# Patient Record
Sex: MALE | Race: ASIAN
[De-identification: names, ages, dates, MRNs, and addresses within clinical notes are randomized per-mention and may not be internally consistent; named-entity substitution may affect disease eponyms.]

---

## 2019-01-17 ENCOUNTER — HOSPITAL ENCOUNTER (INPATIENT)
Dept: HOSPITAL 96 - M.ERS | Age: 44
LOS: 1 days | Discharge: HOME | DRG: 303 | End: 2019-01-18
Attending: INTERNAL MEDICINE | Admitting: INTERNAL MEDICINE
Payer: COMMERCIAL

## 2019-01-17 VITALS — DIASTOLIC BLOOD PRESSURE: 104 MMHG | SYSTOLIC BLOOD PRESSURE: 152 MMHG

## 2019-01-17 VITALS — DIASTOLIC BLOOD PRESSURE: 86 MMHG | SYSTOLIC BLOOD PRESSURE: 123 MMHG

## 2019-01-17 VITALS — DIASTOLIC BLOOD PRESSURE: 81 MMHG | SYSTOLIC BLOOD PRESSURE: 129 MMHG

## 2019-01-17 VITALS — DIASTOLIC BLOOD PRESSURE: 81 MMHG | SYSTOLIC BLOOD PRESSURE: 127 MMHG

## 2019-01-17 VITALS — WEIGHT: 158 LBS | BODY MASS INDEX: 22.62 KG/M2 | HEIGHT: 70 IN

## 2019-01-17 DIAGNOSIS — Z88.8: ICD-10-CM

## 2019-01-17 DIAGNOSIS — I10: ICD-10-CM

## 2019-01-17 DIAGNOSIS — Z88.6: ICD-10-CM

## 2019-01-17 DIAGNOSIS — Z95.5: ICD-10-CM

## 2019-01-17 DIAGNOSIS — I25.2: ICD-10-CM

## 2019-01-17 DIAGNOSIS — I25.119: Primary | ICD-10-CM

## 2019-01-17 DIAGNOSIS — Z82.49: ICD-10-CM

## 2019-01-17 DIAGNOSIS — F12.90: ICD-10-CM

## 2019-01-17 DIAGNOSIS — Z91.19: ICD-10-CM

## 2019-01-17 DIAGNOSIS — Z87.891: ICD-10-CM

## 2019-01-17 DIAGNOSIS — E78.5: ICD-10-CM

## 2019-01-17 DIAGNOSIS — Z90.81: ICD-10-CM

## 2019-01-17 DIAGNOSIS — Z90.49: ICD-10-CM

## 2019-01-17 LAB
ABSOLUTE BASOPHILS: 0.1 THOU/UL (ref 0–0.2)
ABSOLUTE EOSINOPHILS: 0.1 THOU/UL (ref 0–0.7)
ABSOLUTE MONOCYTES: 0.7 THOU/UL (ref 0–1.2)
ALBUMIN SERPL-MCNC: 4 G/DL (ref 3.4–5)
ALP SERPL-CCNC: 71 U/L (ref 46–116)
ALT SERPL-CCNC: 25 U/L (ref 30–65)
ANION GAP SERPL CALC-SCNC: 6 MMOL/L (ref 7–16)
AST SERPL-CCNC: 13 U/L (ref 15–37)
BASOPHILS NFR BLD AUTO: 0.9 %
BILIRUB SERPL-MCNC: 0.4 MG/DL
BUN SERPL-MCNC: 10 MG/DL (ref 7–18)
CALCIUM SERPL-MCNC: 9 MG/DL (ref 8.5–10.1)
CHLORIDE SERPL-SCNC: 102 MMOL/L (ref 98–107)
CHOLEST SERPL-MCNC: 212 MG/DL (ref ?–200)
CO2 SERPL-SCNC: 30 MMOL/L (ref 21–32)
CREAT SERPL-MCNC: 1 MG/DL (ref 0.6–1.3)
EOSINOPHIL NFR BLD: 1.1 %
GLUCOSE SERPL-MCNC: 129 MG/DL (ref 70–99)
GRANULOCYTES NFR BLD MANUAL: 55.3 %
HCT VFR BLD CALC: 48.6 % (ref 42–52)
HDLC SERPL-MCNC: 43 MG/DL (ref 40–?)
HGB BLD-MCNC: 16.4 GM/DL (ref 14–18)
LDLC SERPL-MCNC: 149 MG/DL (ref ?–100)
LYMPHOCYTES # BLD: 3.6 THOU/UL (ref 0.8–5.3)
LYMPHOCYTES NFR BLD AUTO: 36 %
MAGNESIUM SERPL-MCNC: 1.8 MG/DL (ref 1.8–2.4)
MCH RBC QN AUTO: 30.5 PG (ref 26–34)
MCHC RBC AUTO-ENTMCNC: 33.7 G/DL (ref 28–37)
MCV RBC: 90.4 FL (ref 80–100)
MONOCYTES NFR BLD: 6.7 %
MPV: 11.3 FL. (ref 7.2–11.1)
NEUTROPHILS # BLD: 5.5 THOU/UL (ref 1.6–8.1)
NUCLEATED RBCS: 0 /100WBC
PLATELET # BLD EST: ADEQUATE 10*3/UL
PLATELET COUNT*: 209 THOU/UL (ref 150–400)
POTASSIUM SERPL-SCNC: 3.9 MMOL/L (ref 3.5–5.1)
PROT SERPL-MCNC: 7.7 G/DL (ref 6.4–8.2)
RBC # BLD AUTO: 5.37 MIL/UL (ref 4.5–6)
RBC MORPH BLD: NORMAL
RDW-CV: 14.2 % (ref 10.5–14.5)
SODIUM SERPL-SCNC: 138 MMOL/L (ref 136–145)
TC:HDL: 4.9 RATIO
TRIGL SERPL-MCNC: 100 MG/DL (ref ?–150)
TROPONIN-I LEVEL: <0.06 NG/ML (ref ?–0.06)
VLDLC SERPL CALC-MCNC: 20 MG/DL (ref ?–40)
WBC # BLD AUTO: 10 THOU/UL (ref 4–11)

## 2019-01-17 NOTE — NUR
PT ARRIVED ON UNIT AT 1125, C/O DULL ACHE TO LEFT SIDE OF CHEST, RATING AT A
2/10. O2 2LPM VIA NC, LS CTA, VSS, TRACING SINUS MELYSSA ON CARDIAC MONITOR. PT
IS UP AD JULIA, A&O X4, ASSESSMENT COMPLETE, ORIENTED PT TO ROOM AND CALL LIGHT.

## 2019-01-17 NOTE — EKG
[place] [address]
Phone:  [phone]                     ELECTROCARDIOGRAM REPORT      
_______________________________________________________________________________
 
Name:       MAXINE STEPHENS        Room:           [place] [address]   ADM IN  
[facility].#:  [id number]      Account #:      [id number]  
Admission:  19     Attend Phys:    Abdiel Tee MD 
Discharge:               Date of Birth:  [birth year]  
         Report #: [id number]
    [id number]
_______________________________________________________________________________
THIS REPORT FOR:  //name//                      
 
                         [facility] ED
                                       
Test Date:    2019               Test Time:    07:45:15
Pat Name:     MAXINE STEPHENS           Department:   
Patient ID:   [id number]            Room:         [facility]
Gender:       M                        Technician:   KARLA
:          1975               Requested By: Joaquin De Leon
Order Number: [id number] MD:   Yannick Acevedo
                                 Measurements
Intervals                              Axis          
Rate:         71                       P:            45
VA:           155                      QRS:          59
QRSD:         93                       T:            22
QT:           341                                    
QTc:          371                                    
                           Interpretive Statements
Sinus rhythm
Consider left ventricular hypertrophy
ST elev, probable normal early repol pattern
Baseline wander in lead(s) V3,V5
Compared to ECG 2016 14:23:17
Sinus bradycardia no longer present
ST (T wave) deviation still present
 
Electronically Signed On 2019 10:24:46 CST by Yannick Acevedo
[email or web address]
 
 
 
 
 
 
 
 
 
 
 
 
 
 
 
  <ELECTRONICALLY SIGNED>
                                           By: Yannick Acevedo MD, FACC   
  19     1024
D: 19 0745   _____________________________________
T: 19 0745   Yannick Acevedo MD, FACC     /EPI

## 2019-01-18 VITALS — DIASTOLIC BLOOD PRESSURE: 100 MMHG | SYSTOLIC BLOOD PRESSURE: 134 MMHG

## 2019-01-18 VITALS — SYSTOLIC BLOOD PRESSURE: 133 MMHG | DIASTOLIC BLOOD PRESSURE: 91 MMHG

## 2019-01-18 VITALS — DIASTOLIC BLOOD PRESSURE: 76 MMHG | SYSTOLIC BLOOD PRESSURE: 137 MMHG

## 2019-01-18 VITALS — SYSTOLIC BLOOD PRESSURE: 123 MMHG | DIASTOLIC BLOOD PRESSURE: 84 MMHG

## 2019-01-18 VITALS — DIASTOLIC BLOOD PRESSURE: 90 MMHG | SYSTOLIC BLOOD PRESSURE: 141 MMHG

## 2019-01-18 LAB
ABSOLUTE BASOPHILS: 0.1 THOU/UL (ref 0–0.2)
ABSOLUTE EOSINOPHILS: 0.2 THOU/UL (ref 0–0.7)
ABSOLUTE MONOCYTES: 0.7 THOU/UL (ref 0–1.2)
ANION GAP SERPL CALC-SCNC: 6 MMOL/L (ref 7–16)
BASOPHILS NFR BLD AUTO: 1.1 %
BUN SERPL-MCNC: 12 MG/DL (ref 7–18)
CALCIUM SERPL-MCNC: 9.2 MG/DL (ref 8.5–10.1)
CHLORIDE SERPL-SCNC: 105 MMOL/L (ref 98–107)
CO2 SERPL-SCNC: 27 MMOL/L (ref 21–32)
CREAT SERPL-MCNC: 0.9 MG/DL (ref 0.6–1.3)
EOSINOPHIL NFR BLD: 2.5 %
GLUCOSE SERPL-MCNC: 112 MG/DL (ref 70–99)
GRANULOCYTES NFR BLD MANUAL: 45 %
HCT VFR BLD CALC: 47.7 % (ref 42–52)
HGB BLD-MCNC: 16.3 GM/DL (ref 14–18)
LYMPHOCYTES # BLD: 3.8 THOU/UL (ref 0.8–5.3)
LYMPHOCYTES NFR BLD AUTO: 43.3 %
MCH RBC QN AUTO: 30.6 PG (ref 26–34)
MCHC RBC AUTO-ENTMCNC: 34.2 G/DL (ref 28–37)
MCV RBC: 89.3 FL (ref 80–100)
MONOCYTES NFR BLD: 8.1 %
MPV: 10.8 FL. (ref 7.2–11.1)
NEUTROPHILS # BLD: 3.9 THOU/UL (ref 1.6–8.1)
NUCLEATED RBCS: 0 /100WBC
PLATELET COUNT*: 216 THOU/UL (ref 150–400)
POTASSIUM SERPL-SCNC: 4 MMOL/L (ref 3.5–5.1)
RBC # BLD AUTO: 5.33 MIL/UL (ref 4.5–6)
RDW-CV: 14.3 % (ref 10.5–14.5)
SODIUM SERPL-SCNC: 138 MMOL/L (ref 136–145)
WBC # BLD AUTO: 8.7 THOU/UL (ref 4–11)

## 2019-01-18 NOTE — CON
[address]  [address]                    CONSULTATION                  
_______________________________________________________________________________
 
Name:       MAXINE STEPHENS        Room:           [address] [place]  
M.R.#:  [id number]      Account #:      [id number]  
Admission:  01/17/19     Attend Phys:    Abdiel Tee MD 
Discharge:               Date of Birth:  [birth year]  
         Report #: [id number]
                                                                     [id number]  
_______________________________________________________________________________
THIS REPORT FOR:  //name//                      
 
CC: Abdiel Ruth
    [facility] physician/PCP
 
DATE OF SERVICE:  01/17/2019
 
 
PRIMARY CARE PHYSICIAN:  None.
 
CHIEF COMPLAINT:  Chest pain, weakness, dizziness.
 
HISTORY OF PRESENT ILLNESS:  The patient is a 43-year-old man with a history of
known coronary artery disease and prior PCI in 2016, was sitting at a meeting at
work and he felt some dizziness, nauseous and felt he could pass out, but he did
not pass out.  He did have some associated chest pressure with that.  He was
worried because there were some similar symptoms to his heart attack.  He
presented to the Emergency Room, his ECG demonstrated a sinus rhythm, normal ST
segments and upon my evaluation on telemetry, the patient is currently
asymptomatic.
 
He denies orthopnea or PND.
 
He is very active and he denies chest pain or pressure.
 
He has not been taking any medications.  His only medications include
supplements.  He had stopped his aspirin, cholesterol, etc. drugs after his PCI.
 He does not have a primary care doctor.  He does not follow up with Dr. Church
to perform his coronary intervention.
 
PAST MEDICAL HISTORY:  Cardiac catheterization in 11/28/2016 revealed a normal
ejection fraction.  He had a 50% LAD stenosis mid body and then an 80% stenosis
in the proximal circumflex coronary artery and the right coronary artery had a
proximal 90% stenosis and a previously placed stent in the distal right coronary
artery, which was patent.  He had bare metal stents placed in the proximal right
coronary artery and circumflex and he was discharged and then he really did not
follow up with us.  Previous splenectomy, appendectomy and hyperlipidemia.  He
is not diabetic.  He is not a smoker.  He was smoking at the time of his
previous PCI, but he has quit smoking.
 
FAMILY HISTORY:  Strong, his father has had MIs and a stent in his 50s.
 
ALLERGIES:  HE HAS ALLERGIES TO PLAVIX WITH RASH.
 
SOCIAL HISTORY:  He is .  He has a history of marijuana use.
 
 
 
[place] [address]                    CONSULTATION                  
_______________________________________________________________________________
 
Name:       STEPHENSMAXINE MATT ANTHONY        Room:           [address]#:  [id number]      Account #:      [id number]  
Admission:  01/17/19     Attend Phys:    Abdiel Tee MD 
Discharge:               Date of Birth:  [birth year]  
         Report #: [id number]
                                                                     [id number]  
_______________________________________________________________________________
 
HOME MEDICATIONS:  None.
 
REVIEW OF SYSTEMS:
GENERAL:  No fevers or chills.
PULMONARY:  No wheezing or cough.
HEMATOLOGIC:  No anemia or bleeding disorders.
NEUROLOGIC:  Denies seizures, headaches or blurry vision.
THROAT:  Denies any dysphagia.
MUSCULOSKELETAL:  No falls.
SKIN:  No rashes.
GASTROINTESTINAL:  No anorexia, nausea or vomiting.
 
OBJECTIVE:
VITAL SIGNS:  Blood pressure is 127/81, pulse is 95, O2 sat is 99% on 2 liters.
GENERAL:  Thin, adult male.  He is alert, in no apparent distress.
HEENT:  Eyes are intact.  No facial asymmetry.
NECK:  Supple.  No jugular venous distention.
CARDIOVASCULAR:  Regular.  I cannot hear a murmur, S3 rub or gallop.
LUNGS:  Clear to auscultation.
ABDOMEN:  Soft, nontender.
EXTREMITIES:  No peripheral edema.
SKIN:  Warm and dry.
 
LABORATORY DATA:  Electrocardiogram shows a sinus rhythm with borderline LVH,
but normal ST segments.  His troponin I first set is 0.06.  His hemoglobin is
16.4, platelet count is 209,000.  Lipids are pending.  His sodium is 138,
potassium 3.9, creatinine is 1.0.  AST is 13, ALT is 25.
 
IMPRESSION:
1.  Chest pain.  His symptoms are somewhat atypical.  They could be vasovagal,
but he has been off of medications including aspirin and has a high risk for
recurrence of coronary artery disease, so we will evaluate him with serial
cardiac troponin levels and keep him on telemetry.  We will plan for stress
testing, if he rules out,
2.  Coronary artery disease.  It is recommended that the patient in lay terms,
he should continue aspirin above and beyond everything else.
3.  Hyperlipidemia.  We will resurvey lipids and treat accordingly.
4.  Vasovagal syncope.  He did not have syncope, but his symptoms seem more
compatible with this and I recommend lifestyle modifications.
 
 
 
 
<ELECTRONICALLY SIGNED>
                                        By:  Yannick Acevedo MD, FACC   
01/18/19     1340
D: 01/17/19 1220_______________________________________
T: 01/17/19 1237Yannick Acevedo MD, FACC      /nt

## 2019-01-18 NOTE — NUR
PT VSS AND STRESS TEST NEGATIVE. PT D/C'D TO HOME WITH NO COMPLICATIONS. PT
EDUCATED TO CHECK BP EVERYDAY AT THE SAME TIME AFTER RELAXING FOR 15
MINUTES AND RECORD BP DAILY AND TAKE TO FOLLOW UP PCP APPOINTMENT IN 2 WEEKS.
PT VERBALIZED UNDERSTANDING TO DC INSTRUCTIONS AT THIS TIME. IV REMOVED INTACT
AND PT AMBULATED OUT WITH STEADY GAIT WITH RN STAFF. HOURLY ROUNDING
MAINTAINED THIS SHIFT.
WILL SIGN OFF CARE AT THIS TIME.

## 2019-01-18 NOTE — CARDNUC
[place] [address]
Phone:  [phone]                     CARDIAC NUCLEAR IMAGING REPORT
_______________________________________________________________________________
 
Name:         MAXINE STEPHENS       Room:          [address] [place] 
[facility]#:    [id number]     Account #:     [id number]  
Admission:    19    Attend Phys:   Abdiel Tee, 
Discharge:                Date of Birth: [birth year]  
Date of Service: 19 1645  Report #:      [id number]
        [id number] 
_______________________________________________________________________________
THIS REPORT FOR:  //name//                      
 
 
--------------- APPROVED REPORT --------------
 
 
Imaging Protocol: Rest Tc-99m/Stress Tc-99m 1 day
Study performed:  2019 12:32:00
 
Indication: Chest pain, dizzy and nausea
Patient Location: In-Patient
Room #: [facility]
Stress Tech: Carolina Chandler
Stress Nurse: Leydi Veloz RN
NM Tech:EVARISTO Gonzalez
 
Ht: 5 ft 10 in  Wt: 159 lbs  BSA:  1.89 m2
    BMI:  22.81
 
Medical History
Medical History: cad, hyperlipidemia,
Medications: enoxaprin, asa 325
Allergies: hydrocodone, statins, plavix
Cardiac Risk Factors: mi, cad, hyperlipidemia, family hx
Previous Cardiac Procedures: PCI
Exercise History: Physically active
 
Resting Data
Rest SPECT myocardial perfusion imaging was performed in supine 
position 30 minutes following the intravenous injection of 11.7 mCi 
of Tc-99m Sestamibi.
Time of rest injection: 0850     Date: 2019
Time of rest imagin     
The images were gated to evaluate regional wall motion and calculate 
left ventricular ejection fraction. 
Administration Route: IV
Administration Site: Left AC
 
Exercise Stress
At peak stress, the patient was injected intravenously with 31.6mCi 
of Tc-99m Sestamibi.
Time of stress injection: 1045     
Time of stress imagin     
Administration Route: IV
Administration Site: Left AC
Gated Stress SPECT was performed 30 minutes after stress 
 
 
[place] [address]
Phone:  [phone]                     CARDIAC NUCLEAR IMAGING REPORT
_______________________________________________________________________________
 
Name:         MAXINE STEPHENS       Room:          [address]#:    [id number]     Account #:     [id number]  
Admission:    19    Attend Phys:   Abdiel Tee, 
Discharge:                Date of Birth: [birth year]  
Date of Service: 19 1645  Report #:      [id number]
        [id number] 
_______________________________________________________________________________
injection.
The images were gated to evaluate regional wall motion and calculate 
left ventricular ejection fraction. 
Prone imaging was performed.
 
Stress Test Details
Stress Test:  Exercise stress testing was performed using a Luke 
protocol.      
 
HR      Max Heart Rate (APMHR): 178 bpm  
Resting HR:            53 bpm   Target HR (85% APMHR): 151 bpm  
Max HR Achieved:  162 bpm        
% of APMHR:         91         
Recovery HR:            77 bpm        
HR response to stress: Normal HR response to stress      
 
BP           
Resting BP:  143/82 mmHg        
Max BP:       218/83 mmHg        
Recovery BP:       145/99 mmHg        
BP response to stress: Normal blood pressure response to 
stress.      
ECG           
Resting ECG:  Sinus Rhythm        
Stress ECG:     Sinus Rhythm        
ST Change: None          
Recovery ECG: Sinus Rhythm        
Recovery ST Change: None        
 
Clinical
Reason for Termination: Fatigue
Stress Symptoms: None
Exercise duration: 9 min 7 sec
Exercise capacity: 10.31 METs
Functional Aerobic Impairment  90%
 
Stress ECG Conclusion
negative ecg
 
Study Quality
Study: Good
Artifact: Mild Increased GI uptake
Lung Uptake: Normal
 
Study Data
At rest, the left ventricular ejection fraction was 65%..   
 
 
[place] [address]
Phone:  [phone]                     CARDIAC NUCLEAR IMAGING REPORT
_______________________________________________________________________________
 
Name:         MAXINE STEPHENS       Room:          [address] [place] 
[facility]#:    [id number]     Account #:     [id number]  
Admission:    19    Attend Phys:   Abdiel Tee, 
Discharge:                Date of Birth: [birth year]  
Date of Service: 19 1645  Report #:      [id number]
        [id number] 
_______________________________________________________________________________
Post stress, the left ventricular ejection was 77%..   
SSS: 0
SRS: 2
SDS: -2      
 
Perfusion
STRESS SPECT images show a small mild intensity inferior defect which 
is noted to be fixed when compared to the SPECT rest images. There is 
uniform uptake of tracer in all other segments. The prone set shows 
normalization of the inferior defect indicating it is likely 
artifact. No reversible defects are seen.
 
Wall Motion
normal all segments
 
Nuclear Conclusion
ECG Findings: negative for ischemia 
Clinical Findings: negative for ischemia 
Nuclear Findings: negative for ischemia 
Exercise Capacity: normal
Left Ventricular Function: normal 
Risk Study: low
Negative exercise perfusion nuclear stress test for ischemia or 
infarct. 
 
<Conclusion>
negative ecg
 
 
 
 
 
 
 
 
 
 
 
 
 
 
 
 
 
  <ELECTRONICALLY SIGNED>
                                           By: Yannick Acevedo MD, FACC   
  19
D: 19   _____________________________________
T: 19   Yannick Acevedo MD, FACC     /INF

## 2020-11-21 ENCOUNTER — HOSPITAL ENCOUNTER (OUTPATIENT)
Dept: HOSPITAL 96 - M.ERS | Age: 45
Setting detail: OBSERVATION
LOS: 1 days | Discharge: HOME | End: 2020-11-22
Attending: INTERNAL MEDICINE | Admitting: INTERNAL MEDICINE
Payer: COMMERCIAL

## 2020-11-21 VITALS — DIASTOLIC BLOOD PRESSURE: 87 MMHG | SYSTOLIC BLOOD PRESSURE: 132 MMHG

## 2020-11-21 VITALS — HEIGHT: 70 IN | WEIGHT: 172 LBS | BODY MASS INDEX: 24.62 KG/M2

## 2020-11-21 VITALS — DIASTOLIC BLOOD PRESSURE: 92 MMHG | SYSTOLIC BLOOD PRESSURE: 153 MMHG

## 2020-11-21 DIAGNOSIS — E78.5: ICD-10-CM

## 2020-11-21 DIAGNOSIS — I25.10: ICD-10-CM

## 2020-11-21 DIAGNOSIS — Z79.899: ICD-10-CM

## 2020-11-21 DIAGNOSIS — R07.89: Primary | ICD-10-CM

## 2020-11-21 DIAGNOSIS — I10: ICD-10-CM

## 2020-11-21 DIAGNOSIS — K21.9: ICD-10-CM

## 2020-11-21 DIAGNOSIS — F17.200: ICD-10-CM

## 2020-11-21 DIAGNOSIS — Z79.82: ICD-10-CM

## 2020-11-21 LAB
ABSOLUTE BASOPHILS: 0 THOU/UL (ref 0–0.2)
ABSOLUTE EOSINOPHILS: 0.2 THOU/UL (ref 0–0.7)
ABSOLUTE MONOCYTES: 0.7 THOU/UL (ref 0–1.2)
ALBUMIN SERPL-MCNC: 3.3 G/DL (ref 3.4–5)
ALP SERPL-CCNC: 63 U/L (ref 46–116)
ALT SERPL-CCNC: 23 U/L (ref 30–65)
ANION GAP SERPL CALC-SCNC: 5 MMOL/L (ref 7–16)
AST SERPL-CCNC: 11 U/L (ref 15–37)
BASOPHILS NFR BLD AUTO: 0.3 %
BILIRUB SERPL-MCNC: 0.1 MG/DL
BUN SERPL-MCNC: 10 MG/DL (ref 7–18)
CALCIUM SERPL-MCNC: 8.1 MG/DL (ref 8.5–10.1)
CHLORIDE SERPL-SCNC: 106 MMOL/L (ref 98–107)
CO2 SERPL-SCNC: 28 MMOL/L (ref 21–32)
CREAT SERPL-MCNC: 0.8 MG/DL (ref 0.6–1.3)
EOSINOPHIL NFR BLD: 2.3 %
GLUCOSE SERPL-MCNC: 115 MG/DL (ref 70–99)
GRANULOCYTES NFR BLD MANUAL: 51.6 %
HCT VFR BLD CALC: 43.1 % (ref 42–52)
HGB BLD-MCNC: 14.5 GM/DL (ref 14–18)
LIPASE: 109 U/L (ref 73–393)
LYMPHOCYTES # BLD: 4.2 THOU/UL (ref 0.8–5.3)
LYMPHOCYTES NFR BLD AUTO: 39.2 %
MAGNESIUM SERPL-MCNC: 1.9 MG/DL (ref 1.8–2.4)
MCH RBC QN AUTO: 30.2 PG (ref 26–34)
MCHC RBC AUTO-ENTMCNC: 33.5 G/DL (ref 28–37)
MCV RBC: 90.2 FL (ref 80–100)
MONOCYTES NFR BLD: 6.6 %
MPV: 10.7 FL. (ref 7.2–11.1)
NEUTROPHILS # BLD: 5.5 THOU/UL (ref 1.6–8.1)
NT-PRO BRAIN NAT PEPTIDE: 27 PG/ML (ref ?–300)
NUCLEATED RBCS: 0 /100WBC
PLATELET COUNT*: 207 THOU/UL (ref 150–400)
POTASSIUM SERPL-SCNC: 3.8 MMOL/L (ref 3.5–5.1)
PROT SERPL-MCNC: 6.5 G/DL (ref 6.4–8.2)
RBC # BLD AUTO: 4.78 MIL/UL (ref 4.5–6)
RDW-CV: 13.7 % (ref 10.5–14.5)
SODIUM SERPL-SCNC: 139 MMOL/L (ref 136–145)
WBC # BLD AUTO: 10.6 THOU/UL (ref 4–11)

## 2020-11-22 VITALS — SYSTOLIC BLOOD PRESSURE: 142 MMHG | DIASTOLIC BLOOD PRESSURE: 91 MMHG

## 2020-11-22 VITALS — SYSTOLIC BLOOD PRESSURE: 115 MMHG | DIASTOLIC BLOOD PRESSURE: 67 MMHG

## 2020-11-22 VITALS — SYSTOLIC BLOOD PRESSURE: 128 MMHG | DIASTOLIC BLOOD PRESSURE: 79 MMHG

## 2020-11-22 VITALS — DIASTOLIC BLOOD PRESSURE: 89 MMHG | SYSTOLIC BLOOD PRESSURE: 134 MMHG

## 2020-11-22 LAB
CHOLEST SERPL-MCNC: 200 MG/DL (ref ?–200)
HDLC SERPL-MCNC: 32 MG/DL (ref 40–?)
LDLC SERPL-MCNC: 131 MG/DL (ref ?–100)
TC:HDL: 6.3 RATIO
TRIGL SERPL-MCNC: 186 MG/DL (ref ?–150)
VLDLC SERPL CALC-MCNC: 37 MG/DL (ref ?–40)

## 2020-11-24 NOTE — EKG
[place] [address]
Phone:  [phone]                     ELECTROCARDIOGRAM REPORT      
_______________________________________________________________________________
 
Name:         STEPHENSMAXINE GUTIERREZ GABRIELLE       Room:          [address]#:    [id number]     Account #:     [id number]  
Admission:    20    Attend Phys:   Abdiel Tee, 
Discharge:    20    Date of Birth: [birth year]  
Date of Service: 20 1825  Report #:      [id number]
        [id number]
_______________________________________________________________________________
THIS REPORT FOR:  //name//                      
 
                         [facility] ED
                                       
Test Date:    2020               Test Time:    18:25:51
Pat Name:     MAXINE STEPHENS           Department:   
Patient ID:   [id number]            Room:         [facility]
Gender:       M                        Technician:   LILY
:          1975               Requested By: Joaquin De Leon
Order Number: [id number] MD:   Bonilla Ruth
                                 Measurements
Intervals                              Axis          
Rate:         61                       P:            39
IN:           178                      QRS:          70
QRSD:         94                       T:            24
QT:           362                                    
QTc:          365                                    
                           Interpretive Statements
Sinus rhythm
ST elev, probable normal early repol pattern
Compared to ECG 2019 07:45:15
No significant changes
Electronically Signed On 2020 8:41:42 CST by Bonilla Ruth
[email or web address]
 
 
 
 
 
 
 
 
 
 
 
 
 
 
 
 
 
 
 
 
  <ELECTRONICALLY SIGNED>
                                           By: BIPIN Ruth MD, [facility]    
  20     0841
D: 20   _____________________________________
T: 20   BIPIN Ruth MD, [facility]      /EPI